# Patient Record
(demographics unavailable — no encounter records)

---

## 2025-04-15 NOTE — REASON FOR VISIT
[Initial] : an initial visit [Abnormal CXR/ Chest CT] : an abnormal CXR/ chest CT [Cough] : cough [Pulmonary Nodules] : pulmonary nodules [Nicotine Dependence] : nicotine dependence [TextEntry] : Patient states he is here to establish care.  He had a CT scan with primary due to smoking history and was referred to pulmonary.  Patient is a current smoker.  Patient lived in [place] for 13 years he moved back in 1991.  During his time there he had pneumonia and chronic bronchitis.  Since his move back he has been ok.  Patient does have a mucus cough in the mornings.

## 2025-04-15 NOTE — HISTORY OF PRESENT ILLNESS
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

## 2025-04-15 NOTE — DISCUSSION/SUMMARY
[FreeTextEntry1] : Mr. Maldonado presents today for an abnormal CAT scan.  He has a long history of tobacco use and is currently actively smoking.  Patient's CAT scan shows no change in small nodules but new infiltrative changes in the lingula and left lower lobe.  I reviewed the CAT scan with the patient and these are definitely inflammatory type changes.  He denies any recent illnesses.  I favor a CAT scan in 3 months after the December study i.e. in April to see if these have resolved.  The patient will obtain the CAT scan in the next week or so and we will then review.  The patient must quit smoking.  Multiple methods were discussed. The patient understands and agrees with plan of care. Today's office visit encompassed 62 minutes. I conducted an extensive history,physical exam and reviewed diagnosis and treatment options including diagnostic tests,radiology studies including cat scans and the use of prescription medication.

## 2025-04-15 NOTE — PROCEDURE
[FreeTextEntry1] : Pulmonary function test performed 4/15/2025 no obstructive or restrictive lung disease.  CAT scan of the chest performed 12/23/2024 no change in nodules from 2022.  New tree-in-bud infiltrates in lingula left lower lobe.

## 2025-05-02 NOTE — DISCUSSION/SUMMARY
[FreeTextEntry1] : Mr. Maldonado has a long history of tobacco use.  Recent CAT scan showed new tree-in-bud inflammatory changes in left lower lobe.  Patient denied all respiratory symptoms.  A follow-up CAT scan shows this is fully resolved.  Other nodules are stable since 2022.  The patient needs yearly low-dose screening CAT scan chest and we will order 1 from 1 year.  The patient must quit smoking once again multiple methods were discussed. The patient understands and agrees with plan of care. Today's office visit encompassed 32 minutes which excludes teaching and separately reported services.. I conducted an extensive history, physical exam and reviewed diagnosis and treatment options including diagnostic tests,radiology studies including cat scans and the use of prescription medication.

## 2025-05-02 NOTE — REASON FOR VISIT
[Follow-Up] : a follow-up visit [Cough] : cough [Pulmonary Nodules] : pulmonary nodules [TextEntry] : Patient here for 3 week f/u. Review CT scan. Cough mostly in the morning.

## 2025-05-02 NOTE — HISTORY OF PRESENT ILLNESS
[Current] : current [Never] : never [TextBox_4] : 65 male  hx tobacco and new tree bud changes on ct chest no fever no chest pain + am cough and some phlegm ++ tobacco no wt loos no hemoptysis  [TextBox_11] : 0.75 [TextBox_13] : 45

## 2025-05-02 NOTE — PROCEDURE
[FreeTextEntry1] : CAT scan of the chest 4/23/2025 mild emphysema. Resolution left lower lobe tree-in-bud changes. No change in all of the nodules from November 2022.